# Patient Record
Sex: MALE | Race: WHITE | NOT HISPANIC OR LATINO | URBAN - METROPOLITAN AREA
[De-identification: names, ages, dates, MRNs, and addresses within clinical notes are randomized per-mention and may not be internally consistent; named-entity substitution may affect disease eponyms.]

---

## 2019-10-18 ENCOUNTER — INPATIENT (INPATIENT)
Facility: HOSPITAL | Age: 69
LOS: 1 days | Discharge: HOME HEALTH | DRG: 469 | End: 2019-10-20
Attending: ORTHOPAEDIC SURGERY | Admitting: ORTHOPAEDIC SURGERY
Payer: MEDICARE

## 2019-10-18 VITALS
TEMPERATURE: 98 F | OXYGEN SATURATION: 96 % | WEIGHT: 194.01 LBS | RESPIRATION RATE: 16 BRPM | HEART RATE: 75 BPM | DIASTOLIC BLOOD PRESSURE: 88 MMHG | HEIGHT: 65.5 IN | SYSTOLIC BLOOD PRESSURE: 156 MMHG

## 2019-10-18 DIAGNOSIS — Z98.890 OTHER SPECIFIED POSTPROCEDURAL STATES: Chronic | ICD-10-CM

## 2019-10-18 DIAGNOSIS — Z98.49 CATARACT EXTRACTION STATUS, UNSPECIFIED EYE: Chronic | ICD-10-CM

## 2019-10-18 LAB
BASOPHILS # BLD AUTO: 0.02 K/UL — SIGNIFICANT CHANGE UP (ref 0–0.2)
BASOPHILS NFR BLD AUTO: 0.2 % — SIGNIFICANT CHANGE UP (ref 0–2)
EOSINOPHIL # BLD AUTO: 0.01 K/UL — SIGNIFICANT CHANGE UP (ref 0–0.5)
EOSINOPHIL NFR BLD AUTO: 0.1 % — SIGNIFICANT CHANGE UP (ref 0–6)
HCT VFR BLD CALC: 40.3 % — SIGNIFICANT CHANGE UP (ref 39–50)
HGB BLD-MCNC: 13.2 G/DL — SIGNIFICANT CHANGE UP (ref 13–17)
IMM GRANULOCYTES NFR BLD AUTO: 0.9 % — SIGNIFICANT CHANGE UP (ref 0–1.5)
LYMPHOCYTES # BLD AUTO: 0.55 K/UL — LOW (ref 1–3.3)
LYMPHOCYTES # BLD AUTO: 4.3 % — LOW (ref 13–44)
MCHC RBC-ENTMCNC: 29.5 PG — SIGNIFICANT CHANGE UP (ref 27–34)
MCHC RBC-ENTMCNC: 32.8 GM/DL — SIGNIFICANT CHANGE UP (ref 32–36)
MCV RBC AUTO: 90.2 FL — SIGNIFICANT CHANGE UP (ref 80–100)
MONOCYTES # BLD AUTO: 0.15 K/UL — SIGNIFICANT CHANGE UP (ref 0–0.9)
MONOCYTES NFR BLD AUTO: 1.2 % — LOW (ref 2–14)
NEUTROPHILS # BLD AUTO: 11.86 K/UL — HIGH (ref 1.8–7.4)
NEUTROPHILS NFR BLD AUTO: 93.3 % — HIGH (ref 43–77)
NRBC # BLD: 0 /100 WBCS — SIGNIFICANT CHANGE UP (ref 0–0)
PLATELET # BLD AUTO: 187 K/UL — SIGNIFICANT CHANGE UP (ref 150–400)
RBC # BLD: 4.47 M/UL — SIGNIFICANT CHANGE UP (ref 4.2–5.8)
RBC # FLD: 13.2 % — SIGNIFICANT CHANGE UP (ref 10.3–14.5)
WBC # BLD: 12.7 K/UL — HIGH (ref 3.8–10.5)
WBC # FLD AUTO: 12.7 K/UL — HIGH (ref 3.8–10.5)

## 2019-10-18 PROCEDURE — 73600 X-RAY EXAM OF ANKLE: CPT | Mod: 26,LT

## 2019-10-18 RX ORDER — MORPHINE SULFATE 50 MG/1
4 CAPSULE, EXTENDED RELEASE ORAL EVERY 4 HOURS
Refills: 0 | Status: DISCONTINUED | OUTPATIENT
Start: 2019-10-18 | End: 2019-10-20

## 2019-10-18 RX ORDER — ALLOPURINOL 300 MG
100 TABLET ORAL DAILY
Refills: 0 | Status: DISCONTINUED | OUTPATIENT
Start: 2019-10-18 | End: 2019-10-20

## 2019-10-18 RX ORDER — OXYCODONE HYDROCHLORIDE 5 MG/1
10 TABLET ORAL EVERY 4 HOURS
Refills: 0 | Status: DISCONTINUED | OUTPATIENT
Start: 2019-10-18 | End: 2019-10-20

## 2019-10-18 RX ORDER — SODIUM CHLORIDE 9 MG/ML
1000 INJECTION, SOLUTION INTRAVENOUS
Refills: 0 | Status: DISCONTINUED | OUTPATIENT
Start: 2019-10-18 | End: 2019-10-18

## 2019-10-18 RX ORDER — POLYETHYLENE GLYCOL 3350 17 G/17G
17 POWDER, FOR SOLUTION ORAL DAILY
Refills: 0 | Status: DISCONTINUED | OUTPATIENT
Start: 2019-10-18 | End: 2019-10-20

## 2019-10-18 RX ORDER — CEFAZOLIN SODIUM 1 G
2000 VIAL (EA) INJECTION EVERY 8 HOURS
Refills: 0 | Status: COMPLETED | OUTPATIENT
Start: 2019-10-18 | End: 2019-10-18

## 2019-10-18 RX ORDER — ONDANSETRON 8 MG/1
4 TABLET, FILM COATED ORAL EVERY 4 HOURS
Refills: 0 | Status: DISCONTINUED | OUTPATIENT
Start: 2019-10-18 | End: 2019-10-20

## 2019-10-18 RX ORDER — KETOROLAC TROMETHAMINE 30 MG/ML
15 SYRINGE (ML) INJECTION EVERY 6 HOURS
Refills: 0 | Status: DISCONTINUED | OUTPATIENT
Start: 2019-10-18 | End: 2019-10-20

## 2019-10-18 RX ORDER — PANTOPRAZOLE SODIUM 20 MG/1
40 TABLET, DELAYED RELEASE ORAL
Refills: 0 | Status: DISCONTINUED | OUTPATIENT
Start: 2019-10-18 | End: 2019-10-20

## 2019-10-18 RX ORDER — ACETAMINOPHEN 500 MG
975 TABLET ORAL EVERY 8 HOURS
Refills: 0 | Status: DISCONTINUED | OUTPATIENT
Start: 2019-10-18 | End: 2019-10-20

## 2019-10-18 RX ORDER — DOCUSATE SODIUM 100 MG
100 CAPSULE ORAL THREE TIMES A DAY
Refills: 0 | Status: DISCONTINUED | OUTPATIENT
Start: 2019-10-18 | End: 2019-10-20

## 2019-10-18 RX ORDER — OXYCODONE HYDROCHLORIDE 5 MG/1
5 TABLET ORAL EVERY 4 HOURS
Refills: 0 | Status: DISCONTINUED | OUTPATIENT
Start: 2019-10-18 | End: 2019-10-20

## 2019-10-18 RX ORDER — ATORVASTATIN CALCIUM 80 MG/1
10 TABLET, FILM COATED ORAL AT BEDTIME
Refills: 0 | Status: DISCONTINUED | OUTPATIENT
Start: 2019-10-18 | End: 2019-10-20

## 2019-10-18 RX ORDER — MAGNESIUM HYDROXIDE 400 MG/1
30 TABLET, CHEWABLE ORAL DAILY
Refills: 0 | Status: DISCONTINUED | OUTPATIENT
Start: 2019-10-18 | End: 2019-10-20

## 2019-10-18 RX ORDER — CEFAZOLIN SODIUM 1 G
2000 VIAL (EA) INJECTION EVERY 8 HOURS
Refills: 0 | Status: DISCONTINUED | OUTPATIENT
Start: 2019-10-18 | End: 2019-10-18

## 2019-10-18 RX ORDER — LOSARTAN POTASSIUM 100 MG/1
100 TABLET, FILM COATED ORAL DAILY
Refills: 0 | Status: DISCONTINUED | OUTPATIENT
Start: 2019-10-18 | End: 2019-10-20

## 2019-10-18 RX ORDER — ASPIRIN/CALCIUM CARB/MAGNESIUM 324 MG
81 TABLET ORAL DAILY
Refills: 0 | Status: DISCONTINUED | OUTPATIENT
Start: 2019-10-19 | End: 2019-10-20

## 2019-10-18 RX ORDER — SENNA PLUS 8.6 MG/1
2 TABLET ORAL AT BEDTIME
Refills: 0 | Status: DISCONTINUED | OUTPATIENT
Start: 2019-10-18 | End: 2019-10-20

## 2019-10-18 RX ORDER — MORPHINE SULFATE 50 MG/1
4 CAPSULE, EXTENDED RELEASE ORAL
Refills: 0 | Status: DISCONTINUED | OUTPATIENT
Start: 2019-10-18 | End: 2019-10-20

## 2019-10-18 RX ORDER — AMLODIPINE BESYLATE 2.5 MG/1
5 TABLET ORAL DAILY
Refills: 0 | Status: DISCONTINUED | OUTPATIENT
Start: 2019-10-18 | End: 2019-10-20

## 2019-10-18 RX ADMIN — MORPHINE SULFATE 4 MILLIGRAM(S): 50 CAPSULE, EXTENDED RELEASE ORAL at 12:30

## 2019-10-18 RX ADMIN — ATORVASTATIN CALCIUM 10 MILLIGRAM(S): 80 TABLET, FILM COATED ORAL at 20:44

## 2019-10-18 RX ADMIN — Medication 975 MILLIGRAM(S): at 20:45

## 2019-10-18 RX ADMIN — Medication 15 MILLIGRAM(S): at 19:08

## 2019-10-18 RX ADMIN — Medication 2000 MILLIGRAM(S): at 23:09

## 2019-10-18 RX ADMIN — Medication 100 MILLIGRAM(S): at 20:44

## 2019-10-18 RX ADMIN — Medication 975 MILLIGRAM(S): at 21:45

## 2019-10-18 RX ADMIN — Medication 15 MILLIGRAM(S): at 13:14

## 2019-10-18 RX ADMIN — LOSARTAN POTASSIUM 100 MILLIGRAM(S): 100 TABLET, FILM COATED ORAL at 20:44

## 2019-10-18 RX ADMIN — MORPHINE SULFATE 4 MILLIGRAM(S): 50 CAPSULE, EXTENDED RELEASE ORAL at 13:19

## 2019-10-18 RX ADMIN — Medication 975 MILLIGRAM(S): at 14:39

## 2019-10-18 RX ADMIN — Medication 100 MILLIGRAM(S): at 14:39

## 2019-10-18 RX ADMIN — AMLODIPINE BESYLATE 5 MILLIGRAM(S): 2.5 TABLET ORAL at 20:44

## 2019-10-18 RX ADMIN — Medication 15 MILLIGRAM(S): at 19:23

## 2019-10-18 RX ADMIN — SODIUM CHLORIDE 140 MILLILITER(S): 9 INJECTION, SOLUTION INTRAVENOUS at 17:07

## 2019-10-18 RX ADMIN — Medication 975 MILLIGRAM(S): at 14:38

## 2019-10-18 RX ADMIN — Medication 2000 MILLIGRAM(S): at 17:06

## 2019-10-18 RX ADMIN — Medication 15 MILLIGRAM(S): at 12:30

## 2019-10-18 NOTE — PROGRESS NOTE ADULT - SUBJECTIVE AND OBJECTIVE BOX
Ortho Post Op Check    Procedure:  L TAR, vickie, achilles tendon lengthening  Surgeon: Dr. Del Valle    Pt comfortable without complaints, pain controlled. Pt notes some numbness in toes in LLE likely 2/2 block.   Denies CP, SOB, N/V, numbness/tingling down RLE.     Vital Signs Last 24 Hrs  T(C): 37.3 (10-18-19 @ 12:00), Max: 37.3 (10-18-19 @ 12:00)  T(F): 99.1 (10-18-19 @ 12:00), Max: 99.1 (10-18-19 @ 12:00)  HR: 72 (10-18-19 @ 12:45) (72 - 88)  BP: 127/74 (10-18-19 @ 12:45) (127/74 - 161/72)  BP(mean): 95 (10-18-19 @ 12:45) (94 - 103)  RR: 10 (10-18-19 @ 12:45) (10 - 24)  SpO2: 98% (10-18-19 @ 12:45) (90% - 98%)      General: Pt Alert and oriented, NAD  DSG AO splint in place LLE C/D/I  Pulses:  wwp, brisk cap refill b/l  Sensation:  mildly diminished in LLE compared to RLE 2/2 block  Motor: wiggling toes LLE                          13.2   12.70 )-----------( 187      ( 18 Oct 2019 13:11 )             40.3           Post-op X-Ray:  components in place well positioned    A/P: 69yMale POD#0 s/p L TAR, vickie, achilles tendon lengthening  - Stable  - Pain Control as needed   - F/u neuro exam  - DVT ppx:  asa starting tomorrow  - Post op abx: ancef  - PT, WBS:  NWB L ankle  - Trend labs  - Dispo pending PT eval    Ortho Pager [phone]

## 2019-10-18 NOTE — H&P ADULT - NSICDXPASTSURGICALHX_GEN_ALL_CORE_FT
PAST SURGICAL HISTORY:  H/O eye surgery bilat torn retina surgery    H/O hemorrhoidectomy fissurectomy 2012    History of cataract surgery bilat

## 2019-10-18 NOTE — H&P ADULT - NSHPLABSRESULTS_GEN_ALL_CORE
Preop cbc, bmp, pt/inr, ptt within normal range, reviewed by medical clearance.  Preop EKG within normal range reviewed by medical clearance   JALEESA HAYDEN

## 2019-10-18 NOTE — H&P ADULT - NSHPSOCIALHISTORY_GEN_ALL_CORE
Patient is a nonsmoker, reports social ETOH use   denies use of a cane or walker for assistance  lives with wife

## 2019-10-18 NOTE — H&P ADULT - HISTORY OF PRESENT ILLNESS
68 y/o male presents with c/o left ankle pain present for over 20 years. Patient reports he fractured his ankle in college and has had pain, stiffness, limited mobility ever since. Patient reports pain has gradually progressed over time. Patient reports failure of conservative management including oral analgesics, NSAID therapy, activity modification without relief of symptoms. He denies use of a walker or cane, he denies associated numbness/tingling of the left lower extremity.   Patient presents for elective left ankle replacement with Dr. Del Valle. He denies recent illness, he denies known h/o DVT, denies use of anticoagulants.

## 2019-10-18 NOTE — H&P ADULT - NSHPPHYSICALEXAM_GEN_ALL_CORE
Gen:  68y/o male, well nourished, well developed, NAD  MSK: Decreased ROM secondary to pain at the left ankle   calves soft, nontender bilaterally   sensation intact to light touch bilateral lower extremities  DP 2+,  brisk capillary refill  EHL/FHL/TA/GS 5/5 bilaterally     Remainder of PE as per medical clearance

## 2019-10-18 NOTE — BRIEF OPERATIVE NOTE - NSICDXBRIEFPROCEDURE_GEN_ALL_CORE_FT
PROCEDURES:  Lengthening of left Achilles tendon 18-Oct-2019 12:33:03  Evan Rosales repair of lateral ligament of ankle 18-Oct-2019 12:32:55  Evan Rosales  Ankle replacement, total, left 18-Oct-2019 12:31:47  Evan Rosales

## 2019-10-18 NOTE — H&P ADULT - PROBLEM SELECTOR PLAN 1
Admit to orthopedics.  Presents for elective L total ankle replacement.   Medically optimized and cleared for surgery by Dr. Lynn

## 2019-10-18 NOTE — H&P ADULT - NSICDXPASTMEDICALHX_GEN_ALL_CORE_FT
PAST MEDICAL HISTORY:  Hyperlipidemia     Hypertension     TIA (transient ischemic attack) stroke,2016

## 2019-10-19 LAB
EXTRA GREEN TOP TUBE: SIGNIFICANT CHANGE UP
EXTRA LAVENDER TOP TUBE: SIGNIFICANT CHANGE UP
HCV AB S/CO SERPL IA: 0.09 S/CO — SIGNIFICANT CHANGE UP
HCV AB SERPL-IMP: SIGNIFICANT CHANGE UP

## 2019-10-19 RX ORDER — OXYCODONE HYDROCHLORIDE 5 MG/1
1 TABLET ORAL
Qty: 42 | Refills: 0
Start: 2019-10-19 | End: 2019-10-25

## 2019-10-19 RX ORDER — DOCUSATE SODIUM 100 MG
1 CAPSULE ORAL
Qty: 0 | Refills: 0 | DISCHARGE
Start: 2019-10-19

## 2019-10-19 RX ORDER — SENNA PLUS 8.6 MG/1
2 TABLET ORAL
Qty: 0 | Refills: 0 | DISCHARGE
Start: 2019-10-19

## 2019-10-19 RX ADMIN — Medication 15 MILLIGRAM(S): at 17:38

## 2019-10-19 RX ADMIN — OXYCODONE HYDROCHLORIDE 10 MILLIGRAM(S): 5 TABLET ORAL at 22:15

## 2019-10-19 RX ADMIN — POLYETHYLENE GLYCOL 3350 17 GRAM(S): 17 POWDER, FOR SOLUTION ORAL at 12:21

## 2019-10-19 RX ADMIN — Medication 100 MILLIGRAM(S): at 12:21

## 2019-10-19 RX ADMIN — ATORVASTATIN CALCIUM 10 MILLIGRAM(S): 80 TABLET, FILM COATED ORAL at 21:10

## 2019-10-19 RX ADMIN — Medication 15 MILLIGRAM(S): at 17:23

## 2019-10-19 RX ADMIN — Medication 100 MILLIGRAM(S): at 14:07

## 2019-10-19 RX ADMIN — Medication 975 MILLIGRAM(S): at 14:07

## 2019-10-19 RX ADMIN — Medication 15 MILLIGRAM(S): at 05:34

## 2019-10-19 RX ADMIN — AMLODIPINE BESYLATE 5 MILLIGRAM(S): 2.5 TABLET ORAL at 05:20

## 2019-10-19 RX ADMIN — Medication 15 MILLIGRAM(S): at 12:36

## 2019-10-19 RX ADMIN — Medication 100 MILLIGRAM(S): at 21:10

## 2019-10-19 RX ADMIN — Medication 81 MILLIGRAM(S): at 12:21

## 2019-10-19 RX ADMIN — OXYCODONE HYDROCHLORIDE 10 MILLIGRAM(S): 5 TABLET ORAL at 23:15

## 2019-10-19 RX ADMIN — Medication 975 MILLIGRAM(S): at 22:10

## 2019-10-19 RX ADMIN — Medication 15 MILLIGRAM(S): at 12:21

## 2019-10-19 RX ADMIN — OXYCODONE HYDROCHLORIDE 5 MILLIGRAM(S): 5 TABLET ORAL at 09:12

## 2019-10-19 RX ADMIN — Medication 15 MILLIGRAM(S): at 05:20

## 2019-10-19 RX ADMIN — Medication 100 MILLIGRAM(S): at 05:21

## 2019-10-19 RX ADMIN — OXYCODONE HYDROCHLORIDE 5 MILLIGRAM(S): 5 TABLET ORAL at 17:23

## 2019-10-19 RX ADMIN — Medication 975 MILLIGRAM(S): at 21:10

## 2019-10-19 RX ADMIN — Medication 975 MILLIGRAM(S): at 05:21

## 2019-10-19 RX ADMIN — OXYCODONE HYDROCHLORIDE 5 MILLIGRAM(S): 5 TABLET ORAL at 10:12

## 2019-10-19 RX ADMIN — Medication 975 MILLIGRAM(S): at 06:20

## 2019-10-19 RX ADMIN — OXYCODONE HYDROCHLORIDE 5 MILLIGRAM(S): 5 TABLET ORAL at 16:23

## 2019-10-19 RX ADMIN — Medication 975 MILLIGRAM(S): at 15:07

## 2019-10-19 RX ADMIN — LOSARTAN POTASSIUM 100 MILLIGRAM(S): 100 TABLET, FILM COATED ORAL at 05:20

## 2019-10-19 RX ADMIN — PANTOPRAZOLE SODIUM 40 MILLIGRAM(S): 20 TABLET, DELAYED RELEASE ORAL at 05:21

## 2019-10-19 NOTE — PHYSICAL THERAPY INITIAL EVALUATION ADULT - CRITERIA FOR SKILLED THERAPEUTIC INTERVENTIONS
impairments found/functional limitations in following categories/therapy frequency/predicted duration of therapy intervention/rehab potential/anticipated equipment needs at discharge/risk reduction/prevention/anticipated discharge recommendation

## 2019-10-19 NOTE — DISCHARGE NOTE PROVIDER - HOSPITAL COURSE
Admitted    Surgery L TAR, Nelli, achilles lengthening    Corry-op Antibiotics    Pain control    DVT prophylaxis    OOB/Physical Therapy

## 2019-10-19 NOTE — DISCHARGE NOTE PROVIDER - CARE PROVIDER_API CALL
Cole Del Valle)  Orthopaedic Surgery  [address] [place] [address]  Phone: [phone]  Fax: [phone]  Follow Up Time:

## 2019-10-19 NOTE — PROGRESS NOTE ADULT - SUBJECTIVE AND OBJECTIVE BOX
S: Patient seen and examined. Pt. doing well, Pain endorsed but controlled. No acute events overnight.    Vital Signs Last 24 Hrs  T(C): 36.7 (19 Oct 2019 05:13), Max: 37.3 (18 Oct 2019 12:00)  T(F): 98 (19 Oct 2019 05:13), Max: 99.1 (18 Oct 2019 12:00)  HR: 74 (19 Oct 2019 05:13) (64 - 88)  BP: 158/84 (19 Oct 2019 05:13) (124/76 - 161/72)  BP(mean): 108 (18 Oct 2019 16:00) (94 - 110)  RR: 18 (19 Oct 2019 05:13) (12 - 24)  SpO2: 97% (19 Oct 2019 05:13) (90% - 100%)    NAD, AOx3, comfortable  Motor: wiggles toes  Sensation: SILT but still diminished from block  Pulses: WWP, CR brisk  Dressing: C/D/I                          13.2   12.70 )-----------( 187      ( 18 Oct 2019 13:11 )             40.3         A/P:  69y Male s/p L Maximus TOLEDO TAL on 10/18         -Stable  -Pain Control  -PT/NWB  -DVT ppx: ASA  -Advance diet as tolerated  -f/u AM labs  -Dispo: pending

## 2019-10-19 NOTE — DISCHARGE NOTE PROVIDER - NSDCFUADDINST_GEN_ALL_CORE_FT
You are not to bear weight on your left ankle and are to ambulate using an assistive device i.e. crutches.   No strenuous activity, heavy lifting, driving or returning to work until cleared by MD.  You have a splint you must keep this clean, dry and intact.   Try to have regular bowel movements, take stool softener or laxative if necessary.  May take Pepcid or Zantac for upset stomach.  May take Aleve or Naproxen instead of Meloxicam/Celebrex.  Swelling may travel all the way down leg to foot, this is normal and will subside in a few weeks.  Call to schedule an appt with Dr. Del Valle for follow up, if you have staples or sutures they will be removed in office.  Contact your doctor if you experience: fever greater than 101.5, chills, chest pain, difficulty breathing, redness or excessive drainage around the incision, other concerns.  Follow up with your primary care provider.

## 2019-10-19 NOTE — DISCHARGE NOTE PROVIDER - NSDCCPCAREPLAN_GEN_ALL_CORE_FT
PRINCIPAL DISCHARGE DIAGNOSIS  Diagnosis: Osteoarthritis of left ankle  Assessment and Plan of Treatment:

## 2019-10-19 NOTE — DISCHARGE NOTE PROVIDER - NSDCCPTREATMENT_GEN_ALL_CORE_FT
PRINCIPAL PROCEDURE  Procedure: Ankle replacement, total, left  Findings and Treatment:       SECONDARY PROCEDURE  Procedure: Brostrom repair of lateral ligament of ankle  Findings and Treatment:     Procedure: Lengthening of left Achilles tendon  Findings and Treatment:

## 2019-10-20 VITALS
SYSTOLIC BLOOD PRESSURE: 132 MMHG | RESPIRATION RATE: 16 BRPM | OXYGEN SATURATION: 96 % | HEART RATE: 75 BPM | TEMPERATURE: 98 F | DIASTOLIC BLOOD PRESSURE: 71 MMHG

## 2019-10-20 LAB
ANION GAP SERPL CALC-SCNC: 12 MMOL/L — SIGNIFICANT CHANGE UP (ref 5–17)
BUN SERPL-MCNC: 22 MG/DL — SIGNIFICANT CHANGE UP (ref 7–23)
CALCIUM SERPL-MCNC: 8.9 MG/DL — SIGNIFICANT CHANGE UP (ref 8.4–10.5)
CHLORIDE SERPL-SCNC: 103 MMOL/L — SIGNIFICANT CHANGE UP (ref 96–108)
CO2 SERPL-SCNC: 26 MMOL/L — SIGNIFICANT CHANGE UP (ref 22–31)
CREAT SERPL-MCNC: 1.06 MG/DL — SIGNIFICANT CHANGE UP (ref 0.5–1.3)
GLUCOSE SERPL-MCNC: 110 MG/DL — HIGH (ref 70–99)
HCT VFR BLD CALC: 42.5 % — SIGNIFICANT CHANGE UP (ref 39–50)
HGB BLD-MCNC: 13.9 G/DL — SIGNIFICANT CHANGE UP (ref 13–17)
MCHC RBC-ENTMCNC: 30 PG — SIGNIFICANT CHANGE UP (ref 27–34)
MCHC RBC-ENTMCNC: 32.7 GM/DL — SIGNIFICANT CHANGE UP (ref 32–36)
MCV RBC AUTO: 91.8 FL — SIGNIFICANT CHANGE UP (ref 80–100)
NRBC # BLD: 0 /100 WBCS — SIGNIFICANT CHANGE UP (ref 0–0)
PLATELET # BLD AUTO: 212 K/UL — SIGNIFICANT CHANGE UP (ref 150–400)
POTASSIUM SERPL-MCNC: 4.3 MMOL/L — SIGNIFICANT CHANGE UP (ref 3.5–5.3)
POTASSIUM SERPL-SCNC: 4.3 MMOL/L — SIGNIFICANT CHANGE UP (ref 3.5–5.3)
RBC # BLD: 4.63 M/UL — SIGNIFICANT CHANGE UP (ref 4.2–5.8)
RBC # FLD: 13.3 % — SIGNIFICANT CHANGE UP (ref 10.3–14.5)
SODIUM SERPL-SCNC: 141 MMOL/L — SIGNIFICANT CHANGE UP (ref 135–145)
WBC # BLD: 13.97 K/UL — HIGH (ref 3.8–10.5)
WBC # FLD AUTO: 13.97 K/UL — HIGH (ref 3.8–10.5)

## 2019-10-20 RX ORDER — OXYCODONE HYDROCHLORIDE 5 MG/1
1 TABLET ORAL
Qty: 24 | Refills: 0
Start: 2019-10-20 | End: 2019-10-23

## 2019-10-20 RX ORDER — ASPIRIN/CALCIUM CARB/MAGNESIUM 324 MG
1 TABLET ORAL
Qty: 45 | Refills: 0
Start: 2019-10-20 | End: 2019-12-03

## 2019-10-20 RX ADMIN — Medication 975 MILLIGRAM(S): at 05:44

## 2019-10-20 RX ADMIN — OXYCODONE HYDROCHLORIDE 10 MILLIGRAM(S): 5 TABLET ORAL at 11:43

## 2019-10-20 RX ADMIN — OXYCODONE HYDROCHLORIDE 10 MILLIGRAM(S): 5 TABLET ORAL at 12:44

## 2019-10-20 RX ADMIN — PANTOPRAZOLE SODIUM 40 MILLIGRAM(S): 20 TABLET, DELAYED RELEASE ORAL at 05:44

## 2019-10-20 RX ADMIN — Medication 975 MILLIGRAM(S): at 06:44

## 2019-10-20 RX ADMIN — Medication 81 MILLIGRAM(S): at 11:43

## 2019-10-20 RX ADMIN — OXYCODONE HYDROCHLORIDE 10 MILLIGRAM(S): 5 TABLET ORAL at 05:45

## 2019-10-20 RX ADMIN — OXYCODONE HYDROCHLORIDE 10 MILLIGRAM(S): 5 TABLET ORAL at 06:45

## 2019-10-20 RX ADMIN — Medication 100 MILLIGRAM(S): at 11:43

## 2019-10-20 RX ADMIN — Medication 975 MILLIGRAM(S): at 15:45

## 2019-10-20 RX ADMIN — Medication 975 MILLIGRAM(S): at 15:15

## 2019-10-20 NOTE — PROGRESS NOTE ADULT - SUBJECTIVE AND OBJECTIVE BOX
S: Patient seen and examined. Pt. doing well, Pain endorsed but controlled. No acute events overnight.    Vital Signs Last 24 Hrs  T(C): 36.8 (20 Oct 2019 11:48), Max: 36.9 (19 Oct 2019 16:41)  T(F): 98.3 (20 Oct 2019 11:48), Max: 98.4 (19 Oct 2019 16:41)  HR: 75 (20 Oct 2019 11:48) (69 - 75)  BP: 132/71 (20 Oct 2019 11:48) (119/70 - 132/71)  BP(mean): --  RR: 16 (20 Oct 2019 11:48) (16 - 18)  SpO2: 96% (20 Oct 2019 11:48) (84% - 98%)    NAD, AOx3, comfortable  Motor: wiggles toes  Sensation: SILT but still diminished from block  Pulses: WWP, CR brisk  Dressing: C/D/I                                                    13.9   13.97 )-----------( 212      ( 20 Oct 2019 06:00 )             42.5       A/P:  69y Male s/p L Maximus TOLEDO TAL on 10/18         -Stable  -Pain Control  -PT/NWB  -DVT ppx: ASA  -Advance diet as tolerated  -f/u AM labs  -Dispo: HPT

## 2019-10-20 NOTE — DISCHARGE NOTE NURSING/CASE MANAGEMENT/SOCIAL WORK - PATIENT PORTAL LINK FT
You can access the FollowMyHealth Patient Portal offered by [facility] by registering at the following website: [email or web address]. By joining [employer]’s FollowMyHealth portal, you will also be able to view your health information using other applications (apps) compatible with our system.

## 2019-10-23 DIAGNOSIS — M19.072 PRIMARY OSTEOARTHRITIS, LEFT ANKLE AND FOOT: ICD-10-CM

## 2019-10-23 DIAGNOSIS — I10 ESSENTIAL (PRIMARY) HYPERTENSION: ICD-10-CM

## 2019-10-23 DIAGNOSIS — M21.172 VARUS DEFORMITY, NOT ELSEWHERE CLASSIFIED, LEFT ANKLE: ICD-10-CM

## 2019-10-23 DIAGNOSIS — E78.5 HYPERLIPIDEMIA, UNSPECIFIED: ICD-10-CM

## 2019-10-23 DIAGNOSIS — M67.02 SHORT ACHILLES TENDON (ACQUIRED), LEFT ANKLE: ICD-10-CM

## 2019-11-01 PROCEDURE — 97162 PT EVAL MOD COMPLEX 30 MIN: CPT

## 2019-11-01 PROCEDURE — 36415 COLL VENOUS BLD VENIPUNCTURE: CPT

## 2019-11-01 PROCEDURE — 97530 THERAPEUTIC ACTIVITIES: CPT

## 2019-11-01 PROCEDURE — 76000 FLUOROSCOPY <1 HR PHYS/QHP: CPT

## 2019-11-01 PROCEDURE — 86803 HEPATITIS C AB TEST: CPT

## 2019-11-01 PROCEDURE — C1776: CPT

## 2019-11-01 PROCEDURE — 73600 X-RAY EXAM OF ANKLE: CPT

## 2019-11-01 PROCEDURE — 80048 BASIC METABOLIC PNL TOTAL CA: CPT

## 2019-11-01 PROCEDURE — 85025 COMPLETE CBC W/AUTO DIFF WBC: CPT

## 2019-11-01 PROCEDURE — C1889: CPT

## 2019-11-01 PROCEDURE — 97116 GAIT TRAINING THERAPY: CPT

## 2019-11-01 PROCEDURE — C1713: CPT

## 2019-11-01 PROCEDURE — 85027 COMPLETE CBC AUTOMATED: CPT

## 2022-08-30 NOTE — ASU PATIENT PROFILE, ADULT - PSH
H/O eye surgery  bilat torn retina surgery  H/O hemorrhoidectomy  fissurectomy 2012  History of cataract surgery  bilat No Universal Safety Interventions

## 2023-04-15 PROBLEM — I10 ESSENTIAL (PRIMARY) HYPERTENSION: Chronic | Status: ACTIVE | Noted: 2019-10-17

## 2023-04-15 PROBLEM — E78.5 HYPERLIPIDEMIA, UNSPECIFIED: Chronic | Status: ACTIVE | Noted: 2019-10-17

## 2023-04-15 PROBLEM — G45.9 TRANSIENT CEREBRAL ISCHEMIC ATTACK, UNSPECIFIED: Chronic | Status: ACTIVE | Noted: 2019-10-17

## 2023-04-20 VITALS
TEMPERATURE: 97 F | HEIGHT: 66 IN | OXYGEN SATURATION: 96 % | DIASTOLIC BLOOD PRESSURE: 90 MMHG | WEIGHT: 204.81 LBS | SYSTOLIC BLOOD PRESSURE: 176 MMHG | RESPIRATION RATE: 16 BRPM | HEART RATE: 63 BPM

## 2023-04-20 RX ORDER — POVIDONE-IODINE 5 %
1 AEROSOL (ML) TOPICAL ONCE
Refills: 0 | Status: COMPLETED | OUTPATIENT
Start: 2023-04-21 | End: 2023-04-21

## 2023-04-20 NOTE — H&P ADULT - NSHPPHYSICALEXAM_GEN_ALL_CORE
MSK:  decreased ROM right ankle 2/2 pain  Remainder of physical exam as per medical clearance note Gen: 74 y/o, NAD  MSK: Decreased right ankle ROM secondary to pain  Skin of right ankle without erythema, ecchymosis, abrasions or lesions, signs of infection, well healed vertical scar anterior aspect of left ankle  Sensation intact to light touch bilateral lower extremities  Pulses: 2+ DP, brisk capillary refill  EHL/FHL/TA/GS 5/5 bilaterally     Rest of PE per MD clearance

## 2023-04-20 NOTE — PATIENT PROFILE ADULT - ARRIVAL FROM
Rapid strep is negative  We will send it or culture and call you if it is positive  Do not take bactrim  Tylenol as needed for pain and fever  Throat lozenges, honey, salt water gargles for symptomatic relief  Follow up with your PCP for worsening or persistent symptoms  Pharyngitis   WHAT YOU NEED TO KNOW:   Pharyngitis, or sore throat, is inflammation of the tissues and structures in your pharynx (throat)  Pharyngitis is most often caused by bacteria  It may also be caused by a cold or flu virus  Other causes include smoking, allergies, or acid reflux  DISCHARGE INSTRUCTIONS:   Call 911 for any of the following:   · You have trouble breathing or swallowing because your throat is swollen or sore  Return to the emergency department if:   · You are drooling because it hurts too much to swallow  · Your fever is higher than 102? F (39?C) or lasts longer than 3 days  · You are confused  · You taste blood in your throat  Contact your healthcare provider if:   · Your throat pain gets worse  · You have a painful lump in your throat that does not go away after 5 days  · Your symptoms do not improve after 5 days  · You have questions or concerns about your condition or care  Medicines:  Viral pharyngitis will go away on its own without treatment  Your sore throat should start to feel better in 3 to 5 days for both viral and bacterial infections  You may need any of the following:  · Antibiotics  treat a bacterial infection  · NSAIDs , such as ibuprofen, help decrease swelling, pain, and fever  NSAIDs can cause stomach bleeding or kidney problems in certain people  If you take blood thinner medicine, always ask your healthcare provider if NSAIDs are safe for you  Always read the medicine label and follow directions  · Acetaminophen  decreases pain and fever  It is available without a doctor's order  Ask how much to take and how often to take it  Follow directions   Acetaminophen can cause liver damage if not taken correctly  · Take your medicine as directed  Contact your healthcare provider if you think your medicine is not helping or if you have side effects  Tell him or her if you are allergic to any medicine  Keep a list of the medicines, vitamins, and herbs you take  Include the amounts, and when and why you take them  Bring the list or the pill bottles to follow-up visits  Carry your medicine list with you in case of an emergency  Manage your symptoms:   · Gargle salt water  Mix ¼ teaspoon salt in an 8 ounce glass of warm water and gargle  This may help decrease swelling in your throat  · Drink liquids as directed  You may need to drink more liquids than usual  Liquids may help soothe your throat and prevent dehydration  Ask how much liquid to drink each day and which liquids are best for you  · Use a cool-steam humidifier  to help moisten the air in your room and calm your cough  · Soothe your throat  with cough drops, ice, soft foods, or popsicles  Prevent the spread of pharyngitis:  Cover your mouth and nose when you cough or sneeze  Do not share food or drinks  Wash your hands often  Use soap and water  If soap and water are unavailable, use an alcohol based hand   Follow up with your healthcare provider as directed:  Write down your questions so you remember to ask them during your visits  © 2017 [address] [employer] [employer] Information is for End User's use only and may not be sold, redistributed or otherwise used for commercial purposes  All illustrations and images included in CareNotes® are the copyrighted property of A [employer] M [employer]  or [employer]  The above information is an  only  It is not intended as medical advice for individual conditions or treatments  Talk to your doctor, nurse or pharmacist before following any medical regimen to see if it is safe and effective for you  Home

## 2023-04-20 NOTE — H&P ADULT - NSHPLABSRESULTS_GEN_ALL_CORE
preop cbc/bmp/pt/inr/ptt - within normal limits, reviewed by medical clearance  echo 4/6/23 reviewed by medical clearance  ekg reviewed by medical clearance  cr 0.82 preop  Povidone iodine nasal swab to be given day of surgery

## 2023-04-20 NOTE — H&P ADULT - PROBLEM SELECTOR PLAN 1
Admit to Orthopaedic Service.  Presents today for elective right total ankle replacement, possible achilles lengthening   Pt medically stable and cleared for procedure today by Dr. Lynn

## 2023-04-20 NOTE — PATIENT PROFILE ADULT - FALL HARM RISK - HARM RISK INTERVENTIONS

## 2023-04-20 NOTE — H&P ADULT - HISTORY OF PRESENT ILLNESS
73M with right ankle pain x   Presents for elective right total ankle replacement, possible achilles lengthening  73M with right ankle pain x 15 years. Patient has hx of left total ankle replacement 3 years ago. Patient states right ankle pain is lateral and medial, radiating into achilles area intermittently.     Pt has a hx of TIA 7 years ago, takes asa 81mg oral, last took 2 weeks ago, denies taking other blood thinners.   Presents for elective right total ankle replacement, possible achilles lengthening  73M with right ankle pain x 15 years. Patient has hx of left total ankle replacement 3 years ago. Patient states right ankle pain is lateral and medial, radiating into achilles area intermittently. Patient has tried and failed conservative management of right ankle pain. Denies hx of DM, stroke, MI, seizures, blood clots.    Pt has a hx of TIA 7 years ago, takes asa 81mg oral, last took 2 weeks ago, denies taking other blood thinners.     Presents for elective right total ankle replacement, possible achilles lengthening  73M with right ankle pain x 15 years. Patient has hx of left total ankle replacement 3 years ago. Patient states right ankle pain is lateral and medial, radiating into achilles area intermittently. Patient has tried and failed conservative management of right ankle pain. Denies use of assistive devices for ambulation. Denies hx of DM, stroke, MI, seizures, blood clots.    Pt has a hx of TIA 7 years ago, takes asa 81mg oral, last took 2 weeks ago, denies taking other blood thinners.     Presents for elective right total ankle replacement, possible achilles lengthening

## 2023-04-21 ENCOUNTER — INPATIENT (INPATIENT)
Facility: HOSPITAL | Age: 73
LOS: 1 days | Discharge: HOME HEALTH | DRG: 469 | End: 2023-04-23
Attending: ORTHOPAEDIC SURGERY | Admitting: ORTHOPAEDIC SURGERY
Payer: MEDICARE

## 2023-04-21 DIAGNOSIS — G45.9 TRANSIENT CEREBRAL ISCHEMIC ATTACK, UNSPECIFIED: ICD-10-CM

## 2023-04-21 DIAGNOSIS — Z98.890 OTHER SPECIFIED POSTPROCEDURAL STATES: Chronic | ICD-10-CM

## 2023-04-21 DIAGNOSIS — Z98.49 CATARACT EXTRACTION STATUS, UNSPECIFIED EYE: Chronic | ICD-10-CM

## 2023-04-21 DIAGNOSIS — M19.071 PRIMARY OSTEOARTHRITIS, RIGHT ANKLE AND FOOT: ICD-10-CM

## 2023-04-21 DIAGNOSIS — E78.5 HYPERLIPIDEMIA, UNSPECIFIED: ICD-10-CM

## 2023-04-21 DIAGNOSIS — I10 ESSENTIAL (PRIMARY) HYPERTENSION: ICD-10-CM

## 2023-04-21 PROCEDURE — 73610 X-RAY EXAM OF ANKLE: CPT | Mod: 26,RT

## 2023-04-21 DEVICE — PIN TEMP FIX TOTAL ANKLE: Type: IMPLANTABLE DEVICE | Status: FUNCTIONAL

## 2023-04-21 DEVICE — IMPLANTABLE DEVICE: Type: IMPLANTABLE DEVICE | Status: FUNCTIONAL

## 2023-04-21 DEVICE — K-WIRE WRIGHT MEDICAL (SINGLE TROCAR) BLUNT 1.4MM X 150MM: Type: IMPLANTABLE DEVICE | Status: FUNCTIONAL

## 2023-04-21 DEVICE — SCREW INBONE BONE REMOVER STERILE: Type: IMPLANTABLE DEVICE | Status: FUNCTIONAL

## 2023-04-21 DEVICE — GUIDE PROPHECY INFINITY PATIENT SPECIFIC: Type: IMPLANTABLE DEVICE | Status: FUNCTIONAL

## 2023-04-21 RX ORDER — ATORVASTATIN CALCIUM 80 MG/1
10 TABLET, FILM COATED ORAL AT BEDTIME
Refills: 0 | Status: DISCONTINUED | OUTPATIENT
Start: 2023-04-21 | End: 2023-04-23

## 2023-04-21 RX ORDER — ASPIRIN/CALCIUM CARB/MAGNESIUM 324 MG
325 TABLET ORAL DAILY
Refills: 0 | Status: DISCONTINUED | OUTPATIENT
Start: 2023-04-21 | End: 2023-04-23

## 2023-04-21 RX ORDER — SODIUM CHLORIDE 9 MG/ML
1000 INJECTION, SOLUTION INTRAVENOUS
Refills: 0 | Status: DISCONTINUED | OUTPATIENT
Start: 2023-04-21 | End: 2023-04-21

## 2023-04-21 RX ORDER — PROPRANOLOL HCL 160 MG
1 CAPSULE, EXTENDED RELEASE 24HR ORAL
Refills: 0 | DISCHARGE

## 2023-04-21 RX ORDER — OXYCODONE HYDROCHLORIDE 5 MG/1
10 TABLET ORAL EVERY 4 HOURS
Refills: 0 | Status: DISCONTINUED | OUTPATIENT
Start: 2023-04-21 | End: 2023-04-23

## 2023-04-21 RX ORDER — ONDANSETRON 8 MG/1
4 TABLET, FILM COATED ORAL EVERY 6 HOURS
Refills: 0 | Status: DISCONTINUED | OUTPATIENT
Start: 2023-04-21 | End: 2023-04-23

## 2023-04-21 RX ORDER — CELECOXIB 200 MG/1
200 CAPSULE ORAL EVERY 12 HOURS
Refills: 0 | Status: DISCONTINUED | OUTPATIENT
Start: 2023-04-22 | End: 2023-04-23

## 2023-04-21 RX ORDER — SENNA PLUS 8.6 MG/1
2 TABLET ORAL AT BEDTIME
Refills: 0 | Status: DISCONTINUED | OUTPATIENT
Start: 2023-04-21 | End: 2023-04-23

## 2023-04-21 RX ORDER — CHLORHEXIDINE GLUCONATE 213 G/1000ML
1 SOLUTION TOPICAL ONCE
Refills: 0 | Status: COMPLETED | OUTPATIENT
Start: 2023-04-21 | End: 2023-04-21

## 2023-04-21 RX ORDER — ALLOPURINOL 300 MG
100 TABLET ORAL DAILY
Refills: 0 | Status: DISCONTINUED | OUTPATIENT
Start: 2023-04-21 | End: 2023-04-23

## 2023-04-21 RX ORDER — CEFAZOLIN SODIUM 1 G
2000 VIAL (EA) INJECTION EVERY 8 HOURS
Refills: 0 | Status: COMPLETED | OUTPATIENT
Start: 2023-04-21 | End: 2023-04-22

## 2023-04-21 RX ORDER — KETOROLAC TROMETHAMINE 30 MG/ML
15 SYRINGE (ML) INJECTION EVERY 6 HOURS
Refills: 0 | Status: DISCONTINUED | OUTPATIENT
Start: 2023-04-21 | End: 2023-04-22

## 2023-04-21 RX ORDER — PANTOPRAZOLE SODIUM 20 MG/1
40 TABLET, DELAYED RELEASE ORAL
Refills: 0 | Status: DISCONTINUED | OUTPATIENT
Start: 2023-04-21 | End: 2023-04-23

## 2023-04-21 RX ORDER — LOSARTAN POTASSIUM 100 MG/1
100 TABLET, FILM COATED ORAL DAILY
Refills: 0 | Status: DISCONTINUED | OUTPATIENT
Start: 2023-04-23 | End: 2023-04-23

## 2023-04-21 RX ORDER — ACETAMINOPHEN 500 MG
650 TABLET ORAL EVERY 6 HOURS
Refills: 0 | Status: DISCONTINUED | OUTPATIENT
Start: 2023-04-21 | End: 2023-04-23

## 2023-04-21 RX ORDER — MAGNESIUM HYDROXIDE 400 MG/1
30 TABLET, CHEWABLE ORAL DAILY
Refills: 0 | Status: DISCONTINUED | OUTPATIENT
Start: 2023-04-21 | End: 2023-04-23

## 2023-04-21 RX ORDER — POLYETHYLENE GLYCOL 3350 17 G/17G
17 POWDER, FOR SOLUTION ORAL AT BEDTIME
Refills: 0 | Status: DISCONTINUED | OUTPATIENT
Start: 2023-04-21 | End: 2023-04-23

## 2023-04-21 RX ORDER — OXYCODONE HYDROCHLORIDE 5 MG/1
5 TABLET ORAL EVERY 4 HOURS
Refills: 0 | Status: DISCONTINUED | OUTPATIENT
Start: 2023-04-21 | End: 2023-04-23

## 2023-04-21 RX ORDER — MORPHINE SULFATE 50 MG/1
2 CAPSULE, EXTENDED RELEASE ORAL
Refills: 0 | Status: DISCONTINUED | OUTPATIENT
Start: 2023-04-21 | End: 2023-04-23

## 2023-04-21 RX ADMIN — SENNA PLUS 2 TABLET(S): 8.6 TABLET ORAL at 21:26

## 2023-04-21 RX ADMIN — Medication 15 MILLIGRAM(S): at 17:48

## 2023-04-21 RX ADMIN — Medication 650 MILLIGRAM(S): at 17:29

## 2023-04-21 RX ADMIN — SODIUM CHLORIDE 100 MILLILITER(S): 9 INJECTION, SOLUTION INTRAVENOUS at 13:47

## 2023-04-21 RX ADMIN — POLYETHYLENE GLYCOL 3350 17 GRAM(S): 17 POWDER, FOR SOLUTION ORAL at 21:26

## 2023-04-21 RX ADMIN — ATORVASTATIN CALCIUM 10 MILLIGRAM(S): 80 TABLET, FILM COATED ORAL at 21:26

## 2023-04-21 RX ADMIN — Medication 100 MILLIGRAM(S): at 15:36

## 2023-04-21 RX ADMIN — Medication 325 MILLIGRAM(S): at 17:29

## 2023-04-21 RX ADMIN — CHLORHEXIDINE GLUCONATE 1 APPLICATION(S): 213 SOLUTION TOPICAL at 07:03

## 2023-04-21 RX ADMIN — Medication 15 MILLIGRAM(S): at 17:29

## 2023-04-21 RX ADMIN — Medication 650 MILLIGRAM(S): at 17:48

## 2023-04-21 RX ADMIN — Medication 1 APPLICATION(S): at 07:03

## 2023-04-21 NOTE — BRIEF OPERATIVE NOTE - NSICDXBRIEFPROCEDURE_GEN_ALL_CORE_FT
PROCEDURES:  Total arthroplasty of right ankle 21-Apr-2023 12:10:28  Yrn Hwang  Lengthening, Achilles tendon 21-Apr-2023 12:10:55  Yrn Hwang

## 2023-04-21 NOTE — PRE-ANESTHESIA EVALUATION ADULT - NSANTHOSAYNRD_GEN_A_CORE
No. DONIS screening performed.  STOP BANG Legend: 0-2 = LOW Risk; 3-4 = INTERMEDIATE Risk; 5-8 = HIGH Risk

## 2023-04-22 LAB
ANION GAP SERPL CALC-SCNC: 6 MMOL/L — SIGNIFICANT CHANGE UP (ref 5–17)
BUN SERPL-MCNC: 21 MG/DL — SIGNIFICANT CHANGE UP (ref 7–23)
CALCIUM SERPL-MCNC: 8.4 MG/DL — SIGNIFICANT CHANGE UP (ref 8.4–10.5)
CHLORIDE SERPL-SCNC: 104 MMOL/L — SIGNIFICANT CHANGE UP (ref 96–108)
CO2 SERPL-SCNC: 27 MMOL/L — SIGNIFICANT CHANGE UP (ref 22–31)
CREAT SERPL-MCNC: 1.22 MG/DL — SIGNIFICANT CHANGE UP (ref 0.5–1.3)
EGFR: 63 ML/MIN/1.73M2 — SIGNIFICANT CHANGE UP
GLUCOSE SERPL-MCNC: 143 MG/DL — HIGH (ref 70–99)
HCT VFR BLD CALC: 38.3 % — LOW (ref 39–50)
HGB BLD-MCNC: 12.4 G/DL — LOW (ref 13–17)
MCHC RBC-ENTMCNC: 30.1 PG — SIGNIFICANT CHANGE UP (ref 27–34)
MCHC RBC-ENTMCNC: 32.4 GM/DL — SIGNIFICANT CHANGE UP (ref 32–36)
MCV RBC AUTO: 93 FL — SIGNIFICANT CHANGE UP (ref 80–100)
NRBC # BLD: 0 /100 WBCS — SIGNIFICANT CHANGE UP (ref 0–0)
PLATELET # BLD AUTO: 148 K/UL — LOW (ref 150–400)
POTASSIUM SERPL-MCNC: 3.5 MMOL/L — SIGNIFICANT CHANGE UP (ref 3.5–5.3)
POTASSIUM SERPL-SCNC: 3.5 MMOL/L — SIGNIFICANT CHANGE UP (ref 3.5–5.3)
RBC # BLD: 4.12 M/UL — LOW (ref 4.2–5.8)
RBC # FLD: 13.2 % — SIGNIFICANT CHANGE UP (ref 10.3–14.5)
SODIUM SERPL-SCNC: 137 MMOL/L — SIGNIFICANT CHANGE UP (ref 135–145)
WBC # BLD: 10.43 K/UL — SIGNIFICANT CHANGE UP (ref 3.8–10.5)
WBC # FLD AUTO: 10.43 K/UL — SIGNIFICANT CHANGE UP (ref 3.8–10.5)

## 2023-04-22 RX ORDER — LANOLIN ALCOHOL/MO/W.PET/CERES
5 CREAM (GRAM) TOPICAL AT BEDTIME
Refills: 0 | Status: DISCONTINUED | OUTPATIENT
Start: 2023-04-22 | End: 2023-04-23

## 2023-04-22 RX ORDER — BENZOCAINE AND MENTHOL 5; 1 G/100ML; G/100ML
1 LIQUID ORAL
Refills: 0 | Status: DISCONTINUED | OUTPATIENT
Start: 2023-04-22 | End: 2023-04-23

## 2023-04-22 RX ORDER — CALCIUM CARBONATE 500(1250)
1 TABLET ORAL THREE TIMES A DAY
Refills: 0 | Status: DISCONTINUED | OUTPATIENT
Start: 2023-04-22 | End: 2023-04-23

## 2023-04-22 RX ADMIN — CELECOXIB 200 MILLIGRAM(S): 200 CAPSULE ORAL at 17:37

## 2023-04-22 RX ADMIN — ATORVASTATIN CALCIUM 10 MILLIGRAM(S): 80 TABLET, FILM COATED ORAL at 21:36

## 2023-04-22 RX ADMIN — SENNA PLUS 2 TABLET(S): 8.6 TABLET ORAL at 21:36

## 2023-04-22 RX ADMIN — Medication 650 MILLIGRAM(S): at 17:37

## 2023-04-22 RX ADMIN — OXYCODONE HYDROCHLORIDE 5 MILLIGRAM(S): 5 TABLET ORAL at 14:03

## 2023-04-22 RX ADMIN — OXYCODONE HYDROCHLORIDE 5 MILLIGRAM(S): 5 TABLET ORAL at 23:32

## 2023-04-22 RX ADMIN — CELECOXIB 200 MILLIGRAM(S): 200 CAPSULE ORAL at 06:28

## 2023-04-22 RX ADMIN — Medication 100 MILLIGRAM(S): at 00:08

## 2023-04-22 RX ADMIN — OXYCODONE HYDROCHLORIDE 10 MILLIGRAM(S): 5 TABLET ORAL at 02:16

## 2023-04-22 RX ADMIN — Medication 650 MILLIGRAM(S): at 23:31

## 2023-04-22 RX ADMIN — Medication 650 MILLIGRAM(S): at 00:08

## 2023-04-22 RX ADMIN — Medication 100 MILLIGRAM(S): at 11:51

## 2023-04-22 RX ADMIN — Medication 650 MILLIGRAM(S): at 06:29

## 2023-04-22 RX ADMIN — POLYETHYLENE GLYCOL 3350 17 GRAM(S): 17 POWDER, FOR SOLUTION ORAL at 21:37

## 2023-04-22 RX ADMIN — OXYCODONE HYDROCHLORIDE 5 MILLIGRAM(S): 5 TABLET ORAL at 15:00

## 2023-04-22 RX ADMIN — Medication 15 MILLIGRAM(S): at 11:51

## 2023-04-22 RX ADMIN — PANTOPRAZOLE SODIUM 40 MILLIGRAM(S): 20 TABLET, DELAYED RELEASE ORAL at 06:28

## 2023-04-22 RX ADMIN — OXYCODONE HYDROCHLORIDE 10 MILLIGRAM(S): 5 TABLET ORAL at 07:30

## 2023-04-22 RX ADMIN — Medication 15 MILLIGRAM(S): at 00:08

## 2023-04-22 RX ADMIN — OXYCODONE HYDROCHLORIDE 10 MILLIGRAM(S): 5 TABLET ORAL at 06:30

## 2023-04-22 RX ADMIN — Medication 15 MILLIGRAM(S): at 06:28

## 2023-04-22 RX ADMIN — Medication 325 MILLIGRAM(S): at 11:55

## 2023-04-22 RX ADMIN — OXYCODONE HYDROCHLORIDE 10 MILLIGRAM(S): 5 TABLET ORAL at 03:16

## 2023-04-22 RX ADMIN — Medication 650 MILLIGRAM(S): at 11:51

## 2023-04-22 NOTE — DISCHARGE NOTE PROVIDER - NSDCMRMEDTOKEN_GEN_ALL_CORE_FT
allopurinol 100 mg oral tablet:   Aspirin Enteric Coated 81 mg oral delayed release tablet: 1 tab(s) orally once a day MDD:2  atorvastatin 10 mg oral tablet: 1 tab(s) orally once a day  losartan 100 mg oral tablet: 1 tab(s) orally once a day  omeprazole 20 mg oral delayed release capsule: 1 orally once a day  propranolol 60 mg oral tablet: 1 orally once a day   aspirin 325 mg oral tablet: 1 tab(s) orally once a day DVT PPX  Innerclean oral tablet: 2 tab(s) orally once a day (at bedtime)  losartan 100 mg oral tablet: 1 tab(s) orally once a day  oxyCODONE 5 mg oral tablet: 1 tab(s) orally every 6 hours as needed for  severe pain MDD: 4  pantoprazole 40 mg oral delayed release tablet: 1 tab(s) orally once a day (before a meal)  propranolol 60 mg oral tablet: 1 orally once a day

## 2023-04-22 NOTE — PHYSICAL THERAPY INITIAL EVALUATION ADULT - PERTINENT HX OF CURRENT PROBLEM, REHAB EVAL
73M with right ankle pain x 15 years. Patient has hx of left total ankle replacement 3 years ago. Patient states right ankle pain is lateral and medial, radiating into achilles area intermittently. Patient has tried and failed conservative management of right ankle pain. Denies use of assistive devices for ambulation. Denies hx of DM, stroke, MI, seizures, blood clots.    Pt has a hx of TIA 7 years ago, takes asa 81mg oral, last took 2 weeks ago, denies taking other blood thinners.     Presents for elective right total ankle replacement, possible achilles lengthening

## 2023-04-22 NOTE — PHYSICAL THERAPY INITIAL EVALUATION ADULT - ADDITIONAL COMMENTS
Pt lives with Wife in a private home with 3STE and with a flight in home. Pt has pmhx of R ankle surgery, and reports to have knee scooter, RW, shower bench. Pt reports to ambulate independently prior to admission.

## 2023-04-22 NOTE — DISCHARGE NOTE PROVIDER - NSDCFUADDINST_GEN_ALL_CORE_FT
Please follow Dr. Matthew**’s instruction sheets (IF APPLICABLE)   ACTIVITY:   - Weight bear as tolerated with assistive device. No strenuous activity, heavy lifting, driving or returning to work until cleared by MD.   - Apply a cold compress to the surgical site several times daily to reduce pain and swelling. For icing, twenty-minute sessions followed by an hour off is recommended. You should ice as frequently as possible. Ice should NEVER be placed directly on the skin. Wearing compression stockings during the first week after surgery can help reduce swelling in your knee, calf and foot, but is not required.      (KNEE REPLACEMENTS)   DO place a pillow under your heel when elevating the leg, to encourage full extension of the knee. Do NOT place a pillow behind your knee for comfort, as this can lead to permanent difficulty straightening your leg. It is normal to develop some swelling in the leg, ankle, and foot because of gravity.     (POSTERIOR HIP REPLACEMENTS)   Hip precautions:   The following precautions will remain in effect for 6 weeks following surgery:   · Do not cross your knees.   · Do not flex your hip (i.e., bring your knee toward your chest) beyond 90 degrees.   · Use a raised toilet seat. Do not use low chairs or couches.     · Sleep with a pillow between your knees.   · Do not reach down to  items on the floor.     (ANTERIOR HIP REPLACEMENTS)   Hip precautions:   · There are no specific range of motion precautions required with this approach to hip replacement.   · A raised toilet seat is not required, although you may find it easier to use one.   · You do not need to sleep with a pillow between your legs.     DRESSING/SHOWERING:   (AQUACEL – brown gel dressing)   - You may shower, your dressing is water-resistant. Do not soak in bathtubs. Remove dressing after postop day 7, then leave incision open to air. You may do this yourself (simply peel it off), or you may ask for assistance from your visiting nurse. Keep your incision clean and dry. Do not pick at your incision. Do not apply creams, ointments or oils to your incision until cleared by your surgeon. Do not soak your incision in sitting water (ie tubs, pools, lakes, etc.) until cleared by your surgeon. Do not scrub the incision – instead, allow soap and water to flow over the incision and then pat it dry with a clean towel.     (PREVENA or PRANAV – incisional wound vac)   - You have an incisional wound vac dressing with tubing to attached canister/battery pack. You may shower but must keep battery pack dry at all times. The battery dies in 7 days then can remove dressing and leave open to air. Keep your incision clean and dry. Do not pick at your incision. Do not apply creams, ointments or oils to your incision until cleared by your surgeon. Do not soak your incision in sitting water (ie tubs, pools, lakes, etc.) until cleared by your surgeon. Do not scrub the incision – instead, allow soap and water to flow over the incision and then pat it dry with a clean towel.     MEDICATION/ANTICOAGULATION:   -You have been prescribed ***, as a preventative to help prevent postoperative blood clots. Please take this medication as prescribed.    - You have been prescribed medications for pain:     - Tylenol for mild to moderate pain. Do not exceed 3,000mg daily.     - For more severe pain, take Tylenol with the addition of narcotic pain medication. Take this medication as prescribed. This medication may cause drowsiness or dizziness. Do not operate machinery. This medication may cause constipation.   - For any additional medications, follow instructions on the bottle.    -Try to have regular bowel movements. Take stool softener or laxative if necessary. You may wish to take Miralax daily until you have regular bowel movements.    - If you have been prescribed Aspirin or an anti-inflammatory, please take prilosec (omeprazole) once a day, before breakfast, until no longer taking Aspirin or anti-inflammatory. This will help protect your stomach.   - If you have a pain management physician, please follow-up with them postoperatively.    - If you experience any negative side effects of your medications, please call your surgeon's office to discuss.      FOLLOW-UP:   - Call to schedule an appt with  *** for follow up.   - Please follow-up with your primary care physician or any other specialist you see postoperatively, if needed.    - Contact your doctor or go to the emergency room if you experience: fever greater than 101.5, chills, chest pain, difficulty breathing, redness or excessive drainage around the incision, other concerns.   Please follow Dr. Del Valle's ’s instruction sheets (IF APPLICABLE)   ACTIVITY:   - You have just underwent R ankle surgery. you should be non weight bearing on that Right leg until further discussion with Dr Del Valle on your first postop visit.  No strenuous activity, heavy lifting, driving or returning to work until cleared by MD.   - Apply a cold compress to the surgical site several times daily to reduce pain and swelling. For icing, twenty-minute sessions followed by an hour off is recommended. You should ice as frequently as possible. Ice should NEVER be placed directly on the skin. Wearing compression stockings during the first week after surgery can help reduce swelling in your knee, calf and foot, but is not required.            FOLLOW-UP:   - Call to schedule an appt with Dr. Del Valle for follow up.   - Please follow-up with your primary care physician or any other specialist you see postoperatively, if needed.    - Contact your doctor or go to the emergency room if you experience: fever greater than 101.5, chills, chest pain, difficulty breathing, redness or excessive drainage around the incision, other concerns.

## 2023-04-22 NOTE — DISCHARGE NOTE PROVIDER - NSDCCPCAREPLAN_GEN_ALL_CORE_FT
PRINCIPAL DISCHARGE DIAGNOSIS  Diagnosis: Osteoarthritis of right ankle  Assessment and Plan of Treatment:

## 2023-04-22 NOTE — DISCHARGE NOTE PROVIDER - CARE PROVIDER_API CALL
Cole Del Valle)  Orthopaedic Surgery  [address] [place] [address]  Phone: [phone]  Fax: [phone]  Follow Up Time: 2 weeks

## 2023-04-22 NOTE — DISCHARGE NOTE PROVIDER - HOSPITAL COURSE
Admitted 4/21/23  Surgery - Right Total Arthroplasty of Ankle, Achilles tendon lengthening   Corry-op Antibiotics  Pain control  DVT prophylaxis  OOB/Physical Therapy

## 2023-04-23 VITALS
RESPIRATION RATE: 18 BRPM | SYSTOLIC BLOOD PRESSURE: 163 MMHG | TEMPERATURE: 98 F | HEART RATE: 79 BPM | OXYGEN SATURATION: 98 % | DIASTOLIC BLOOD PRESSURE: 73 MMHG

## 2023-04-23 LAB
ANION GAP SERPL CALC-SCNC: 10 MMOL/L — SIGNIFICANT CHANGE UP (ref 5–17)
BUN SERPL-MCNC: 17 MG/DL — SIGNIFICANT CHANGE UP (ref 7–23)
CALCIUM SERPL-MCNC: 8.2 MG/DL — LOW (ref 8.4–10.5)
CHLORIDE SERPL-SCNC: 104 MMOL/L — SIGNIFICANT CHANGE UP (ref 96–108)
CO2 SERPL-SCNC: 26 MMOL/L — SIGNIFICANT CHANGE UP (ref 22–31)
CREAT SERPL-MCNC: 1 MG/DL — SIGNIFICANT CHANGE UP (ref 0.5–1.3)
EGFR: 80 ML/MIN/1.73M2 — SIGNIFICANT CHANGE UP
GLUCOSE SERPL-MCNC: 124 MG/DL — HIGH (ref 70–99)
HCT VFR BLD CALC: 36 % — LOW (ref 39–50)
HGB BLD-MCNC: 11.9 G/DL — LOW (ref 13–17)
MCHC RBC-ENTMCNC: 30.2 PG — SIGNIFICANT CHANGE UP (ref 27–34)
MCHC RBC-ENTMCNC: 33.1 GM/DL — SIGNIFICANT CHANGE UP (ref 32–36)
MCV RBC AUTO: 91.4 FL — SIGNIFICANT CHANGE UP (ref 80–100)
NRBC # BLD: 0 /100 WBCS — SIGNIFICANT CHANGE UP (ref 0–0)
PLATELET # BLD AUTO: 147 K/UL — LOW (ref 150–400)
POTASSIUM SERPL-MCNC: 3.7 MMOL/L — SIGNIFICANT CHANGE UP (ref 3.5–5.3)
POTASSIUM SERPL-SCNC: 3.7 MMOL/L — SIGNIFICANT CHANGE UP (ref 3.5–5.3)
RBC # BLD: 3.94 M/UL — LOW (ref 4.2–5.8)
RBC # FLD: 13.1 % — SIGNIFICANT CHANGE UP (ref 10.3–14.5)
SODIUM SERPL-SCNC: 140 MMOL/L — SIGNIFICANT CHANGE UP (ref 135–145)
WBC # BLD: 8.71 K/UL — SIGNIFICANT CHANGE UP (ref 3.8–10.5)
WBC # FLD AUTO: 8.71 K/UL — SIGNIFICANT CHANGE UP (ref 3.8–10.5)

## 2023-04-23 PROCEDURE — C1889: CPT

## 2023-04-23 PROCEDURE — C1776: CPT

## 2023-04-23 PROCEDURE — 80048 BASIC METABOLIC PNL TOTAL CA: CPT

## 2023-04-23 PROCEDURE — 97116 GAIT TRAINING THERAPY: CPT

## 2023-04-23 PROCEDURE — 36415 COLL VENOUS BLD VENIPUNCTURE: CPT

## 2023-04-23 PROCEDURE — 85027 COMPLETE CBC AUTOMATED: CPT

## 2023-04-23 PROCEDURE — C1713: CPT

## 2023-04-23 PROCEDURE — 73610 X-RAY EXAM OF ANKLE: CPT

## 2023-04-23 RX ORDER — SENNA PLUS 8.6 MG/1
2 TABLET ORAL
Qty: 60 | Refills: 0
Start: 2023-04-23 | End: 2023-05-22

## 2023-04-23 RX ORDER — OXYCODONE HYDROCHLORIDE 5 MG/1
1 TABLET ORAL
Qty: 28 | Refills: 0
Start: 2023-04-23 | End: 2023-04-29

## 2023-04-23 RX ORDER — ASPIRIN/CALCIUM CARB/MAGNESIUM 324 MG
1 TABLET ORAL
Qty: 35 | Refills: 0
Start: 2023-04-23 | End: 2023-05-27

## 2023-04-23 RX ORDER — OMEPRAZOLE 10 MG/1
1 CAPSULE, DELAYED RELEASE ORAL
Refills: 0 | DISCHARGE

## 2023-04-23 RX ORDER — PANTOPRAZOLE SODIUM 20 MG/1
1 TABLET, DELAYED RELEASE ORAL
Qty: 30 | Refills: 0
Start: 2023-04-23 | End: 2023-05-22

## 2023-04-23 RX ADMIN — Medication 325 MILLIGRAM(S): at 11:48

## 2023-04-23 RX ADMIN — OXYCODONE HYDROCHLORIDE 5 MILLIGRAM(S): 5 TABLET ORAL at 00:32

## 2023-04-23 RX ADMIN — OXYCODONE HYDROCHLORIDE 5 MILLIGRAM(S): 5 TABLET ORAL at 12:40

## 2023-04-23 RX ADMIN — OXYCODONE HYDROCHLORIDE 5 MILLIGRAM(S): 5 TABLET ORAL at 11:48

## 2023-04-23 RX ADMIN — PANTOPRAZOLE SODIUM 40 MILLIGRAM(S): 20 TABLET, DELAYED RELEASE ORAL at 05:13

## 2023-04-23 RX ADMIN — Medication 100 MILLIGRAM(S): at 11:48

## 2023-04-23 RX ADMIN — Medication 650 MILLIGRAM(S): at 11:48

## 2023-04-23 RX ADMIN — CELECOXIB 200 MILLIGRAM(S): 200 CAPSULE ORAL at 05:13

## 2023-04-23 RX ADMIN — LOSARTAN POTASSIUM 100 MILLIGRAM(S): 100 TABLET, FILM COATED ORAL at 05:13

## 2023-04-23 RX ADMIN — Medication 650 MILLIGRAM(S): at 05:13

## 2023-04-23 NOTE — PROGRESS NOTE ADULT - SUBJECTIVE AND OBJECTIVE BOX
Ortho AM Progress Note     Procedure: right total ankle arthroscopy, gastroc lengthening and medial malleolus screw  Surgeon: Dr. Ivon STANTON, pain well controlled.   Denies CP, SOB, N/V, numbness/tingling     Vital Signs Last 24 Hrs  T(C): 36.7 (23 Apr 2023 05:11), Max: 36.8 (22 Apr 2023 23:32)  T(F): 98.1 (23 Apr 2023 05:11), Max: 98.3 (22 Apr 2023 23:32)  HR: 74 (23 Apr 2023 05:11) (62 - 76)  BP: 146/85 (23 Apr 2023 05:11) (138/67 - 164/72)  BP(mean): --  RR: 18 (23 Apr 2023 05:11) (16 - 18)  SpO2: 97% (23 Apr 2023 05:11) (96% - 98%)    Parameters below as of 23 Apr 2023 05:11  Patient On (Oxygen Delivery Method): room air      PE:  General: Pt Alert and oriented, NAD  DSG AO splint RLE C/D/I  Pulses: brisk cap refill distal right lower extremity   Motor right lower extremity: limited exam due to popliteal nerve block and right lower leg splint           A/P: 73yMale POD#2 s/p right ankle arthroscopy, gastroc lengthening and medial malleolus screw  - VSStable  - Pain Control  - DVT ppx:  QD   - Post op abx: Ancef  - PT, WBS: NWB RLE  - will follow up neuromotor exam  DISPO: pending PT evaluation, plan for home d/c POD1 v POD2    Ortho Pager [phone]
Ortho AM Progress Note     Procedure: right total ankle arthroscopy, gastroc lengthening and medial malleolus screw  Surgeon: Dr. Ivon STANTON, pain well controlled.   Denies CP, SOB, N/V, numbness/tingling     Vital Signs Last 24 Hrs  T(C): 36.9 (22 Apr 2023 00:10), Max: 36.9 (22 Apr 2023 00:10)  T(F): 98.4 (22 Apr 2023 00:10), Max: 98.4 (22 Apr 2023 00:10)  HR: 75 (22 Apr 2023 02:15) (62 - 76)  BP: 127/63 (22 Apr 2023 02:15) (108/57 - 176/90)  BP(mean): 111 (21 Apr 2023 14:15) (100 - 111)  RR: 16 (22 Apr 2023 02:15) (15 - 21)  SpO2: 99% (22 Apr 2023 02:15) (94% - 100%)    Parameters below as of 22 Apr 2023 02:15  Patient On (Oxygen Delivery Method): room air      PE:  General: Pt Alert and oriented, NAD  DSG AO splint RLE C/D/I  Pulses: brisk cap refill distal right lower extremity   Motor right lower extremity: limited exam due to popliteal nerve block and right lower leg splint           A/P: 73yMale POD#1 s/p right ankle arthroscopy, gastroc lengthening and medial malleolus screw  - VSStable  - Pain Control  - DVT ppx:  QD   - Post op abx: Ancef  - PT, WBS: NWB RLE  - will follow up neuromotor exam  DISPO: pending PT evaluation, plan for home d/c POD1 v POD2    Ortho Pager [phone]
Ortho Post Op Check    Procedure: right total ankle arthroscopy, gastroc lengthening and medial malleolus screw  Surgeon: Dr. Del Valle    Pt comfortable without complaints in bed in the PACU, pain controlled  Denies CP, SOB, N/V, numbness/tingling     Vital Signs Last 24 Hrs  T(C): 36.6 (04-21-23 @ 12:15), Max: 36.6 (04-21-23 @ 12:15)  T(F): 97.9 (04-21-23 @ 12:15), Max: 97.9 (04-21-23 @ 12:15)  HR: 62 (04-21-23 @ 13:45) (62 - 72)  BP: 139/80 (04-21-23 @ 13:15) (135/77 - 152/77)  BP(mean): 104 (04-21-23 @ 13:15) (100 - 104)  RR: 21 (04-21-23 @ 13:45) (15 - 21)  SpO2: 96% (04-21-23 @ 13:45) (94% - 96%)  I&O's Summary    PE:  General: Pt Alert and oriented, NAD  DSG AO splint RLE C/D/I  Pulses: brisk cap refill distal right lower extremity   Motor right lower extremity: limited exam due to popliteal nerve block and right lower leg splint       Post-op X-Ray:  ACC: [id number] EXAM:  XR ANKLE COMP MIN 3 VIEWS RT   ORDERED BY: CAITLYN MORALES     PROCEDURE DATE:  04/21/2023      INTERPRETATION:  Clinical history/reason for exam: Postop. 3 views, splint.    COMPARISON: October 18, 2019.    Findings/impression: Interim medial malleolar screw. Redemonstration ankle   arthroplasty in satisfactory position with no hardware complication.   Ankle degenerative changes.    A/P: 73yMale POD#0 s/p right ankle arthroscopy, gastroc lengthening and medial malleolus screw  - VSStable  - Pain Control  - DVT ppx:  QD   - Post op abx: Ancef  - PT, WBS: NWB RLE  - will follow up neuromotor exam  DISPO: pending PT evaluation, plan for home d/c POD1 v POD2    Ortho Pager [phone]

## 2023-04-23 NOTE — DISCHARGE NOTE NURSING/CASE MANAGEMENT/SOCIAL WORK - PATIENT PORTAL LINK FT
You can access the FollowMyHealth Patient Portal offered by [facility] by registering at the following website: [email or web address]. By joining [facility]’s FollowMyHealth portal, you will also be able to view your health information using other applications (apps) compatible with our system.

## 2023-04-23 NOTE — DISCHARGE NOTE NURSING/CASE MANAGEMENT/SOCIAL WORK - HAVE YOU RECEIVED AT LEAST TWO PFIZER AND/OR MODERNA VACCINATIONS (IN ANY COMBINATION) AND/OR ONE JOHNSON & JOHNSON VACCINATION?
PAST MEDICAL HISTORY:  Borderline diabetes     Diverticulosis     Hyperlipidemia     Hypertension     Obesity     Seizure     
Yes

## 2023-04-23 NOTE — DISCHARGE NOTE NURSING/CASE MANAGEMENT/SOCIAL WORK - NSDCPEFALRISK_GEN_ALL_CORE
For information on Fall & Injury Prevention, visit: [email or web address] OR  [email or web address] OR  https://www.cdc.gov/steadi/patient.html

## 2023-04-26 DIAGNOSIS — Z79.82 LONG TERM (CURRENT) USE OF ASPIRIN: ICD-10-CM

## 2023-04-26 DIAGNOSIS — I10 ESSENTIAL (PRIMARY) HYPERTENSION: ICD-10-CM

## 2023-04-26 DIAGNOSIS — M67.01 SHORT ACHILLES TENDON (ACQUIRED), RIGHT ANKLE: ICD-10-CM

## 2023-04-26 DIAGNOSIS — M19.071 PRIMARY OSTEOARTHRITIS, RIGHT ANKLE AND FOOT: ICD-10-CM

## 2023-04-26 DIAGNOSIS — Z86.73 PERSONAL HISTORY OF TRANSIENT ISCHEMIC ATTACK (TIA), AND CEREBRAL INFARCTION WITHOUT RESIDUAL DEFICITS: ICD-10-CM

## 2023-04-26 DIAGNOSIS — E78.5 HYPERLIPIDEMIA, UNSPECIFIED: ICD-10-CM

## 2023-04-26 DIAGNOSIS — M21.071 VALGUS DEFORMITY, NOT ELSEWHERE CLASSIFIED, RIGHT ANKLE: ICD-10-CM

## 2023-09-06 NOTE — PATIENT PROFILE ADULT - MONEY FOR FOOD
Left message for Ba to call to schedule physical w/med check with Dr Beach.(last physical 10/4/2022) Advised Ba he needs to be seen every 6 months to prevent any medication delays.     
no

## 2023-12-15 NOTE — DISCHARGE NOTE PROVIDER - NSDCCPTREATMENT_GEN_ALL_CORE_FT
explained and declined/yes PRINCIPAL PROCEDURE  Procedure: Total arthroplasty of right ankle  Findings and Treatment:       SECONDARY PROCEDURE  Procedure: Lengthening, Achilles tendon  Findings and Treatment:

## 2024-01-09 NOTE — ANESTHESIA FOLLOW-UP NOTE - NSINTERVIEW_GEN_ALL_CORE
Interviewed and evaluated Render Risk Assessment In Note?: no Note Text (......Xxx Chief Complaint.): This diagnosis correlates with the Detail Level: Zone Other (Free Text): At present, no history/ risk factors for scabies. No signs of bites/ infestations present today.

## 2024-09-27 NOTE — PRE-OP CHECKLIST - AS TEMP SITE
Pulmonary embolism Pulmonary embolism History of thrombocytosis Pulmonary embolism Pulmonary embolism forehead
